# Patient Record
Sex: FEMALE | Race: WHITE | NOT HISPANIC OR LATINO | Employment: OTHER | URBAN - NONMETROPOLITAN AREA
[De-identification: names, ages, dates, MRNs, and addresses within clinical notes are randomized per-mention and may not be internally consistent; named-entity substitution may affect disease eponyms.]

---

## 2017-01-04 ENCOUNTER — OFFICE VISIT (OUTPATIENT)
Dept: ORTHOPEDIC SURGERY | Facility: CLINIC | Age: 67
End: 2017-01-04

## 2017-01-04 VITALS — WEIGHT: 199 LBS | RESPIRATION RATE: 18 BRPM | HEIGHT: 63 IN | BODY MASS INDEX: 35.26 KG/M2

## 2017-01-04 DIAGNOSIS — Z98.890 STATUS POST ARTHROSCOPIC SURGERY OF LEFT KNEE: Primary | ICD-10-CM

## 2017-01-04 DIAGNOSIS — M17.0 PRIMARY OSTEOARTHRITIS OF BOTH KNEES: ICD-10-CM

## 2017-01-04 DIAGNOSIS — M17.12 PRIMARY OSTEOARTHRITIS OF LEFT KNEE: ICD-10-CM

## 2017-01-04 PROCEDURE — 99024 POSTOP FOLLOW-UP VISIT: CPT | Performed by: ORTHOPAEDIC SURGERY

## 2017-01-04 PROCEDURE — 20610 DRAIN/INJ JOINT/BURSA W/O US: CPT | Performed by: ORTHOPAEDIC SURGERY

## 2017-01-04 PROCEDURE — 73560 X-RAY EXAM OF KNEE 1 OR 2: CPT | Performed by: ORTHOPAEDIC SURGERY

## 2017-01-04 RX ORDER — OXYCODONE AND ACETAMINOPHEN 10; 325 MG/1; MG/1
TABLET ORAL
Refills: 0 | COMMUNITY
Start: 2016-12-13 | End: 2017-03-07

## 2017-01-04 RX ADMIN — LIDOCAINE HYDROCHLORIDE 2 ML: 10 INJECTION, SOLUTION INFILTRATION; PERINEURAL at 15:16

## 2017-01-04 RX ADMIN — METHYLPREDNISOLONE ACETATE 40 MG: 40 INJECTION, SUSPENSION INTRA-ARTICULAR; INTRALESIONAL; INTRAMUSCULAR; SOFT TISSUE at 15:16

## 2017-01-04 NOTE — MR AVS SNAPSHOT
Lilian Eldridget   1/4/2017 2:00 PM   Office Visit    Dept Phone:  [phone]   Encounter #:  [id number]    Provider:  Brian Torres MD   Department:  [facility] ORTHOPEDICS AND SPORTS MEDICINE                Your Full Care Plan              Your Updated Medication List          This list is accurate as of: 1/4/17  3:37 PM.  Always use your most recent med list.                amitriptyline 10 MG tablet   Commonly known as:  ELAVIL       carisoprodol 350 MG tablet   Commonly known as:  SOMA       carvedilol 12.5 MG tablet   Commonly known as:  COREG       * citalopram 10 MG tablet   Commonly known as:  CeleXA       * citalopram 40 MG tablet   Commonly known as:  CeleXA       estropipate 1.5 MG tablet   Commonly known as:  OGEN       gabapentin 300 MG capsule   Commonly known as:  NEURONTIN       LORazepam 1 MG tablet   Commonly known as:  ATIVAN       losartan-hydrochlorothiazide 50-12.5 MG per tablet   Commonly known as:  HYZAAR       * metFORMIN  MG 24 hr tablet   Commonly known as:  GLUCOPHAGE-XR       * metFORMIN 500 MG tablet   Commonly known as:  GLUCOPHAGE       naproxen sodium 220 MG tablet   Commonly known as:  ALEVE       omeprazole 20 MG capsule   Commonly known as:  priLOSEC       * oxyCODONE 10 MG 12 hr tablet   Commonly known as:  oxyCONTIN       * oxyCODONE 10 MG tablet   Commonly known as:  ROXICODONE       oxyCODONE-acetaminophen  MG per tablet   Commonly known as:  PERCOCET       ranitidine 50-0.45 MG/50ML-%   Commonly known as:  ZANTAC IN NACL   PRE OPERATIVELY       traMADol 50 MG tablet   Commonly known as:  ULTRAM       * Notice:  This list has 6 medication(s) that are the same as other medications prescribed for you. Read the directions carefully, and ask your doctor or other care provider to review them with you.            We Performed the Following     Large Joint Arthrocentesis       You Were Diagnosed With        Codes  Comments    Status post arthroscopic surgery of left knee    -  Primary ICD-10-CM: Z98.890  ICD-9-CM: V45.89     Primary osteoarthritis of both knees     ICD-10-CM: M17.0  ICD-9-CM: 715.16     Primary osteoarthritis of left knee     ICD-10-CM: M17.12  ICD-9-CM: 715.16       Instructions     None    Patient Instructions History      Upcoming Appointments     Visit Type Date Time Department    POST-OP 2017  2:00 PM [facility]    OFFICE VISIT 4/3/2017  1:00 PM [facility]    FOLLOW UP 8/10/2017  1:30 PM [facility] Signup     [facility] [employer] allows you to send messages to your doctor, view your test results, renew your prescriptions, schedule appointments, and more. To sign up, go to [employer] and click on the Sign Up Now link in the New User? box. Enter your [employer] Activation Code exactly as it appears below along with the last four digits of your Social Security Number and your Date of Birth () to complete the sign-up process. If you do not sign up before the expiration date, you must request a new code.    [employer] Activation Code: [email or web address]  Expires: 2017  3:35 PM    If you have questions, you can email [email or web address] or call [phone] to talk to our [employer] staff. Remember, [employer] is NOT to be used for urgent needs. For medical emergencies, dial 911.               Other Info from Your Visit           Your Appointments     2017  1:00 PM EDT   Office Visit with Brian Torres MD   [facility] ORTHOPEDICS AND SPORTS MEDICINE (--)    [address]  [facility] [id number] [phone]           Arrive 15 minutes prior to appointment.            Aug 10, 2017  1:30 PM EDT   Follow Up with Rory Pineda MD   [facility] CARDIOLOGY (--)    [address]  [facility] [id number] [phone]           Arrive 15 minutes prior to appointment.             "  Allergies     Celebrex [Celecoxib]      Avoid routine use due to renal issues    Diflucan [Fluconazole]      Erythromycin      Neosporin [Neomycin-bacitracin Zn-polymyx]      Nsaids      Avoid routine use due to renal issues    Tape        Reason for Visit     Left Knee - Post-op           Vital Signs     Respirations Height Weight Body Mass Index Smoking Status       18 63\" (160 cm) 199 lb (90.3 kg) 35.25 kg/m2 Current Every Day Smoker       Problems and Diagnoses Noted     Osteoarthritis (arthritis due to wear and tear of joints)    Status post arthroscopic surgery of left knee    -  Primary    Degenerative joint disease of lower leg            "

## 2017-01-04 NOTE — PROGRESS NOTES
Subjective   Patient ID: Lilian Beauchamp is a 66 y.o. female is here today for a post-operative visit  Post-op of the Left Knee        History of Present Illness     Pain controlled: [] no   [x] yes   Medication refill requested: [x] no   [] yes    Patient compliant with instructions: [] no   [x] yes   Other: Patient reports persistent diffuse left knee pain with any notable relief after arthroscopy and subchondroplasty.  She is now 10 weeks from surgery.  There can be an extended recovery phase with the bone injection subchondroplasty though we also reviewed options at this time including and we proceeded with localized injection.     Past Medical History   Diagnosis Date   • Arthritis      rheumatoid and osteoarthritis   • Colon polyps    • Depression with anxiety 8/5/2016   • Derangement of meniscus      left   • Diabetes mellitus    • Diabetes mellitus, type 2 8/5/2016   • Disease of thyroid gland    • Diverticulosis small intestine    • Dyslipidemia 8/5/2016   • GERD (gastroesophageal reflux disease)    • H/O corticosteroid therapy      both shoulders   • Hyperlipidemia    • Hypertension    • Knee osteoarthritis    • Obese 8/5/2016     BMI 38.7   • Osteoarthritis 8/5/2016   • Renal abscess 2004     and sepsis wiht prolonged hospitalization    • Rheumatoid arthritis    • Tobacco abuse 8/5/2016     Waucoma packs of tobacco weekly   • Urine incontinence         Past Surgical History   Procedure Laterality Date   • Cholecystectomy     • Appendectomy     • Hysterectomy     • Parathyroidectomy     • Tonsillectomy and adenoidectomy     • Cataract extraction Bilateral    • Knee arthroscopy Left 10/21/2016     PMM, 2 CC, SUBCHONDROPLASTY - BRAD Torres MD       Allergies   Allergen Reactions   • Celebrex [Celecoxib]      Avoid routine use due to renal issues   • Diflucan [Fluconazole]    • Erythromycin    • Neosporin [Neomycin-Bacitracin Zn-Polymyx]    • Nsaids      Avoid routine use due to renal issues   • Tape   "      Review of Systems   Constitutional: Negative for diaphoresis and fever.   HENT: Negative for sore throat.    Eyes: Negative for visual disturbance.   Respiratory: Negative for shortness of breath.    Cardiovascular: Negative for chest pain.   Gastrointestinal: Negative for abdominal distention and abdominal pain.   Genitourinary: Negative for dysuria.   Musculoskeletal: Positive for arthralgias. Negative for gait problem and joint swelling.   Skin: Negative for rash.   Neurological: Negative for speech difficulty.   Hematological: Does not bruise/bleed easily.   Psychiatric/Behavioral: Negative for confusion.       Objective   Visit Vitals   • Resp 18   • Ht 63\" (160 cm)   • Wt 199 lb (90.3 kg)   • BMI 35.25 kg/m2         Signs of infection: [x] no                    [] yes   Drainage: [x] no                    [] yes   Incision: [] healing well     [x]healed well   Motor exam intact: [] no                    [x] yes   Neurovascular exam intact: [] no                    [x] yes   Signs of compartment syndrome: [x] no                    [] yes   Signs of DVT: [x] no                    [] yes   Other:      Physical Exam   Constitutional: She appears well-developed. No distress.   Musculoskeletal: She exhibits deformity (mild left knee varus).        Left knee: She exhibits no effusion.   Neurological: She is alert. Gait normal.   Skin: Skin is warm and dry. No rash noted. No erythema.   Psychiatric: She has a normal mood and affect. Her speech is normal.   Vitals reviewed.    Left Knee Exam     Tenderness   The patient is experiencing tenderness in the medial retinaculum, patella and pes anserinus.    Range of Motion   Extension: 0   Flexion: 100     Muscle Strength     The patient has normal left knee strength.    Tests   Donna:  Medial - negative   Varus: negative  Valgus: negative  Patellar Apprehension: negative    Other   Erythema: absent (baseline dystrophic darkening both lower legs above ankle " area.)  Scars: present (well helaed portatls)  Swelling: mild  Effusion: no effusion present        Extremity DVT signs are Negative on physical exam with negative Memo sign, with no calf pain and with no palpable cords  Neurologic Exam     Mental Status   Attention: normal.   Speech: speech is normal   Level of consciousness: alert  Knowledge: good.     Motor Exam   Overall muscle tone: normal    Gait, Coordination, and Reflexes     Gait  Gait: normal (stable part-time cane assist ambulation.  Does not have cane at office visit today.)    Left Knee Exam     Muscle Strength   Normal left knee strength          Assessment/Plan   Independent Review of Radiographic Studies:    Indication to evaluate pain and with prior comparison views, shows no acute fracture or disclocation evident.  Indication to evaluate joint condition, and compared with prior images, shows evident chronic advanced anteromedial osteoarthritis.  The bone filler calcium triphosphate can be well seen on the radiographs in the desired location of the subchondral medial tibia plateau.  Laboratory and Other Studies:  No new results reviewed today.   Medical Decision Making:    No complications of procedure and treatments.  Limited progress with persistent and/or progressive symptoms.  Continue current management and any additional treatments and workup as outlined in plan.     Large Joint Arthrocentesis  Date/Time: 1/4/2017 3:16 PM  Consent given by: patient  Site marked: site marked  Timeout: Immediately prior to procedure a time out was called to verify the correct patient, procedure, equipment, support staff and site/side marked as required   Supporting Documentation  Indications: pain   Procedure Details  Location: knee - L knee  Preparation: Patient was prepped and draped in the usual sterile fashion  Needle size: 22 G  Approach: anteromedial  Medications administered: 2 mL lidocaine 1 %; 40 mg methylPREDNISolone acetate 40 MG/ML  Patient  tolerance: patient tolerated the procedure well with no immediate complications          Lilian was seen today for post-op.    Diagnoses and all orders for this visit:    Status post arthroscopic surgery of left knee  -     XR Knee 1 or 2 View Left    Primary osteoarthritis of both knees  -     XR Knee 1 or 2 View Left    Primary osteoarthritis of left knee  -     Large Joint Arthrocentesis         Recommendations/Plan:     Staples/Sutures removed: []At today's visit     [x]At previous visit   Brace: [x]not provided        []pt provided with:   Physical therapy: []not at this time    [x]home-based    []outpatient referral   Ultrasound: [x]not ordered         []order given to patient   Labs: [x]not ordered         []order given to patient   Weight Bearing status: [x]Full []WBAT []PWB []NWB []Other   Prescriptions: [x]None given   []As Noted   Imaging at next appt: []Yes  [x]No          Additional instructions: Patient agreeable to return sooner for any new concern.     Regular exercise as tolerated  Orthopedic activities reviewed and patient expressed appreciation  Discussion of orthopedic goals  Risk, benefits, and merits of treatment alternatives reviewed with the patient and questions answered  Call or notify for any adverse effect from injection therapy  Ice, heat, and/or modalities as beneficial  Watch for signs and symptoms of infection    Exercise, medications, injections, other patient advice, and return appointment as noted.  Brace: No brace was given at today's visit  Referral: No referrals made at today's visit  Test/Studies: No additional studies ordered.  Surgery: No surgery proposed at this visit.  Work/Activity Status: May perform usual activities as tolerated and No strenuous activity  Patient is encouraged to call or return for any issues or concerns.    Return in about 3 months (around 4/4/2017) for Recheck.  Patient agreeable to call or return sooner for any concerns.

## 2017-01-08 RX ORDER — LIDOCAINE HYDROCHLORIDE 10 MG/ML
2 INJECTION, SOLUTION INFILTRATION; PERINEURAL
Status: COMPLETED | OUTPATIENT
Start: 2017-01-04 | End: 2017-01-04

## 2017-01-08 RX ORDER — METHYLPREDNISOLONE ACETATE 40 MG/ML
40 INJECTION, SUSPENSION INTRA-ARTICULAR; INTRALESIONAL; INTRAMUSCULAR; SOFT TISSUE
Status: COMPLETED | OUTPATIENT
Start: 2017-01-04 | End: 2017-01-04

## 2017-03-07 ENCOUNTER — CONSULT (OUTPATIENT)
Dept: CARDIOLOGY | Facility: CLINIC | Age: 67
End: 2017-03-07

## 2017-03-07 VITALS
HEIGHT: 63 IN | WEIGHT: 202.2 LBS | RESPIRATION RATE: 18 BRPM | HEART RATE: 72 BPM | BODY MASS INDEX: 35.83 KG/M2 | SYSTOLIC BLOOD PRESSURE: 122 MMHG | DIASTOLIC BLOOD PRESSURE: 70 MMHG | OXYGEN SATURATION: 98 %

## 2017-03-07 DIAGNOSIS — R42 DIZZINESS: ICD-10-CM

## 2017-03-07 DIAGNOSIS — I10 ESSENTIAL HYPERTENSION: ICD-10-CM

## 2017-03-07 DIAGNOSIS — R07.9 CHEST PAIN IN ADULT: Primary | ICD-10-CM

## 2017-03-07 DIAGNOSIS — R06.02 SHORTNESS OF BREATH: ICD-10-CM

## 2017-03-07 DIAGNOSIS — R00.2 PALPITATIONS: ICD-10-CM

## 2017-03-07 PROCEDURE — 93000 ELECTROCARDIOGRAM COMPLETE: CPT | Performed by: INTERNAL MEDICINE

## 2017-03-07 PROCEDURE — 99215 OFFICE O/P EST HI 40 MIN: CPT | Performed by: INTERNAL MEDICINE

## 2017-03-07 RX ORDER — OXYCODONE AND ACETAMINOPHEN 10; 325 MG/1; MG/1
1 TABLET ORAL EVERY 6 HOURS PRN
COMMUNITY

## 2017-03-07 NOTE — PROGRESS NOTES
Subjective:     Encounter Date:03/07/2017      Patient ID: Lilian Beauchamp is a 66 y.o. female.    Chief Complaint: Chest pain ,shortness of breath, palpitations  HPI  This is a 66-year-old female patient with no prior history of documented cardiac disease who presents to cardiology clinic with a several week history of worsening chest discomfort.  She describes the discomfort as a retrosternal pressure sensation that radiates into her left arm and left posterior back.  The discomfort occurs approximately 3-4 times per week and will last anywhere from a few minutes to half an hour.  The discomfort is not associated with shortness of breath nausea vomiting or diaphoresis.  She cannot identify any precipitating aggravating or alleviating features.  The discomfort nearly occurs at rest.  She previously underwent a cardiac catheterization in 2008 which showed no focal obstructive coronary disease.  Her ejection fraction was normal at that time.  She also reports having shortness of breath both at rest and with activity.  There is no orthopnea or PND.  She does have lower extremity edema which occurs on occasion and is worse if she's been on her feet for prolonged periods of time.  She has a history of hypertension which is treated but takes no cholesterol-lowering medication.  Her past medical history is remarkable for hyperlipidemia but the details are unknown.  The patient has also had palpitations and tachycardia for many years.  She indicates that she was treated with propranolol for many years for sinus tachycardia.  There is no history of documented arrhythmia.  She has no dizziness or syncope.  It has been over 5 years since her last stress test.  It has been over 5 years since her last heart monitor.  She has a history of type 2 diabetes mellitus.  She is a nonsmoker.  Her family history is remarkable for premature coronary disease.  The following portions of the patient's history were reviewed and updated  as appropriate: allergies, current medications, past family history, past medical history, past social history, past surgical history and problem  Review of Systems   Constitution: Negative for chills, diaphoresis, fever, weakness, malaise/fatigue, night sweats, weight gain and weight loss.   HENT: Negative for ear discharge, hearing loss, hoarse voice and nosebleeds.    Eyes: Negative for discharge, double vision, pain and photophobia.   Cardiovascular: Positive for chest pain, dyspnea on exertion, irregular heartbeat, leg swelling and palpitations. Negative for claudication, cyanosis, near-syncope, orthopnea, paroxysmal nocturnal dyspnea and syncope.   Respiratory: Positive for shortness of breath. Negative for cough, hemoptysis, sputum production and wheezing.    Endocrine: Negative for cold intolerance, heat intolerance, polydipsia, polyphagia and polyuria.   Hematologic/Lymphatic: Negative for adenopathy and bleeding problem. Does not bruise/bleed easily.   Skin: Negative for color change, flushing, itching and rash.   Musculoskeletal: Negative for muscle cramps, muscle weakness, myalgias and stiffness.   Gastrointestinal: Negative for abdominal pain, diarrhea, hematemesis, hematochezia, nausea and vomiting.   Genitourinary: Negative for dysuria, frequency and nocturia.   Neurological: Negative for focal weakness, loss of balance, numbness, paresthesias and seizures.   Psychiatric/Behavioral: Negative for altered mental status, hallucinations and suicidal ideas.   Allergic/Immunologic: Negative for HIV exposure, hives and persistent infections.       Current Outpatient Prescriptions:   •  amitriptyline (ELAVIL) 10 MG tablet, Take 10 mg by mouth Every Night., Disp: , Rfl: 1  •  carisoprodol (SOMA) 350 MG tablet, Take 350 mg by mouth every night., Disp: , Rfl:   •  carvedilol (COREG) 12.5 MG tablet, Take 12.5 mg by mouth 2 (two) times a day with meals., Disp: , Rfl:   •  estropipate (OGEN) 1.5 MG tablet, , Disp:  , Rfl: 1  •  gabapentin (NEURONTIN) 300 MG capsule, take 1 capsule by mouth three times a day, Disp: , Rfl: 0  •  LORazepam (ATIVAN) 1 MG tablet, Take 1 mg by mouth every 12 (twelve) hours as needed for anxiety., Disp: , Rfl:   •  losartan-hydrochlorothiazide (HYZAAR) 50-12.5 MG per tablet, take 1 tablet by mouth once daily, Disp: , Rfl: 0  •  naproxen sodium (ALEVE) 220 MG tablet, Take 220 mg by mouth 2 (two) times a day as needed for mild pain (1-3)., Disp: , Rfl:   •  omeprazole (PriLOSEC) 20 MG capsule, Take 20 mg by mouth daily., Disp: , Rfl:   •  oxyCODONE-acetaminophen (PERCOCET)  MG per tablet, Take 1 tablet by mouth Every 6 (Six) Hours As Needed for moderate pain (4-6)., Disp: , Rfl:   •  traMADol (ULTRAM) 50 MG tablet, TAKE 1 TABLET BY MOUTH EVERY 8 HOURS, Disp: , Rfl: 0  •  citalopram (CeleXA) 40 MG tablet, Take 40 mg by mouth 2 (Two) Times a Day., Disp: , Rfl: 0  •  metFORMIN XR (GLUCOPHAGE-XR) 500 MG 24 hr tablet, Take 1,000 mg by mouth 2 (two) times a day., Disp: , Rfl:      Objective:     Physical Exam   Constitutional: She is oriented to person, place, and time. She appears well-developed and well-nourished.   HENT:   Head: Normocephalic and atraumatic.   Mouth/Throat: Oropharynx is clear and moist.   Eyes: Conjunctivae and EOM are normal. Pupils are equal, round, and reactive to light. No scleral icterus.   Neck: Normal range of motion. Neck supple. No JVD present. No tracheal deviation present. No thyromegaly present.   Cardiovascular: Normal rate, regular rhythm, S1 normal, S2 normal, normal heart sounds, intact distal pulses and normal pulses.  PMI is not displaced.  Exam reveals no gallop and no friction rub.    No murmur heard.  Pulmonary/Chest: Effort normal and breath sounds normal. No respiratory distress. She has no wheezes. She has no rales.   Abdominal: Soft. Bowel sounds are normal. She exhibits no distension and no mass. There is no tenderness. There is no rebound and no guarding.  "  Musculoskeletal: Normal range of motion. She exhibits no edema or deformity.   Neurological: She is alert and oriented to person, place, and time. She displays normal reflexes. No cranial nerve deficit. Coordination normal.   Skin: Skin is warm and dry. No rash noted. No erythema.   Psychiatric: She has a normal mood and affect. Her behavior is normal. Thought content normal.     Blood pressure 122/70, pulse 72, resp. rate 18, height 63\" (160 cm), weight 202 lb 3.2 oz (91.7 kg), SpO2 98 %.   Lab Review:       Assessment:         1. Chest pain in adult  The patient's chest discomfort has features both typical and atypical for coronary insufficiency.  She has multiple risk factors for premature coronary disease.  She is unable to do adequate treadmill exercise stress testing due to poor effort tolerance, shortness of breath with activity and obesity.  - ECG 12 Lead  - Stress Test With Myocardial Perfusion Two Day  - Adult Transthoracic Echo Complete    2. Shortness of breath  This is multifactorial in etiology due to a combination of obesity and aerobic deconditioning.  There may be an element of hypertensive related cardiac disease and/or unrecognized congestive heart failure.  This could also represent an angina equivalent.  - ECG 12 Lead  - Stress Test With Myocardial Perfusion Two Day  - Adult Transthoracic Echo Complete    3. Palpitations  Of her symptoms could be related to underlying arrhythmia and/or ectopy.  - ECG 12 Lead  - Adult Transthoracic Echo Complete  - Holter Monitor - 72 Hour    4. Dizziness  This is mostly postural in nature and I suspect is related to her medications.  It could be related to underlying undiagnosed arrhythmia.  - Adult Transthoracic Echo Complete  - Holter Monitor - 72 Hour    5. Essential hypertension  Blood pressure control is quite acceptable by today's reading.  - Stress Test With Myocardial Perfusion Two Day  - Adult Transthoracic Echo Complete    ECG 12 Lead  Date/Time: " 3/7/2017 2:07 PM  Performed by: JEREMY GAITAN  Authorized by: JEREMY GAITAN                Plan:       I recommended a 2 day vasodilator nuclear stress test due to her body habitus.  I have recommended an echocardiogram as well as a 72 hour Holter monitor.  No changes in her medication profile have been made at today's visit.  Further recommendations will be predicated on the outcome of her outpatient testing.

## 2017-03-27 ENCOUNTER — APPOINTMENT (OUTPATIENT)
Dept: NUCLEAR MEDICINE | Facility: HOSPITAL | Age: 67
End: 2017-03-27
Attending: INTERNAL MEDICINE

## 2017-03-28 ENCOUNTER — APPOINTMENT (OUTPATIENT)
Dept: GENERAL RADIOLOGY | Facility: HOSPITAL | Age: 67
End: 2017-03-28
Attending: INTERNAL MEDICINE

## 2017-03-28 ENCOUNTER — APPOINTMENT (OUTPATIENT)
Dept: NUCLEAR MEDICINE | Facility: HOSPITAL | Age: 67
End: 2017-03-28
Attending: INTERNAL MEDICINE

## 2017-09-30 ENCOUNTER — TRANSCRIBE ORDERS (OUTPATIENT)
Dept: ADMINISTRATIVE | Facility: HOSPITAL | Age: 67
End: 2017-09-30

## 2017-09-30 DIAGNOSIS — E11.9 DIABETES MELLITUS WITHOUT COMPLICATION (HCC): ICD-10-CM

## 2017-09-30 DIAGNOSIS — R53.83 OTHER FATIGUE: Primary | ICD-10-CM

## 2019-02-26 ENCOUNTER — TELEPHONE (OUTPATIENT)
Dept: SURGERY | Facility: CLINIC | Age: 69
End: 2019-02-26

## 2021-06-16 ENCOUNTER — HOSPITAL ENCOUNTER (OUTPATIENT)
Dept: ULTRASOUND IMAGING | Facility: HOSPITAL | Age: 71
Discharge: HOME | End: 2021-06-16
Admitting: NURSE PRACTITIONER

## 2021-06-16 ENCOUNTER — TRANSCRIBE ORDERS (OUTPATIENT)
Dept: ULTRASOUND IMAGING | Facility: HOSPITAL | Age: 71
End: 2021-06-16

## 2021-06-16 DIAGNOSIS — M79.602 LEFT ARM PAIN: Primary | ICD-10-CM

## 2021-06-16 DIAGNOSIS — M79.602 LEFT ARM PAIN: ICD-10-CM

## 2021-06-16 PROCEDURE — 93971 EXTREMITY STUDY: CPT

## 2021-07-08 PROBLEM — K90.9 CHRONIC STEATORRHEA: Status: ACTIVE | Noted: 2021-07-08

## 2021-07-08 PROBLEM — D50.9 IRON DEFICIENCY ANEMIA, UNSPECIFIED: Status: ACTIVE | Noted: 2021-07-08

## 2021-07-08 RX ORDER — SODIUM CHLORIDE 9 MG/ML
250 INJECTION, SOLUTION INTRAVENOUS CONTINUOUS
Status: CANCELLED | OUTPATIENT
Start: 2021-07-12

## 2021-07-12 ENCOUNTER — HOSPITAL ENCOUNTER (OUTPATIENT)
Dept: INFUSION THERAPY | Facility: HOSPITAL | Age: 71
Setting detail: INFUSION SERIES
End: 2021-07-12

## 2021-07-19 ENCOUNTER — INFUSION (OUTPATIENT)
Dept: ONCOLOGY | Facility: HOSPITAL | Age: 71
End: 2021-07-19

## 2021-07-19 VITALS — BODY MASS INDEX: 34.45 KG/M2 | WEIGHT: 194.5 LBS

## 2021-07-19 DIAGNOSIS — K90.9 CHRONIC STEATORRHEA: ICD-10-CM

## 2021-07-19 DIAGNOSIS — D50.9 IRON DEFICIENCY ANEMIA, UNSPECIFIED IRON DEFICIENCY ANEMIA TYPE: Primary | ICD-10-CM

## 2021-07-19 PROCEDURE — 25010000002 FERRIC CARBOXYMALTOSE 750 MG/15ML SOLUTION 15 ML VIAL: Performed by: NURSE PRACTITIONER

## 2021-07-19 PROCEDURE — 96375 TX/PRO/DX INJ NEW DRUG ADDON: CPT

## 2021-07-19 PROCEDURE — 96365 THER/PROPH/DIAG IV INF INIT: CPT

## 2021-07-19 RX ORDER — SODIUM CHLORIDE 9 MG/ML
250 INJECTION, SOLUTION INTRAVENOUS CONTINUOUS
Status: DISCONTINUED | OUTPATIENT
Start: 2021-07-19 | End: 2021-07-19 | Stop reason: HOSPADM

## 2021-07-19 RX ORDER — SODIUM CHLORIDE 9 MG/ML
250 INJECTION, SOLUTION INTRAVENOUS CONTINUOUS
Status: CANCELLED | OUTPATIENT
Start: 2021-07-26

## 2021-07-19 RX ADMIN — FERRIC CARBOXYMALTOSE INJECTION 750 MG: 50 INJECTION, SOLUTION INTRAVENOUS at 14:58

## 2021-07-26 ENCOUNTER — APPOINTMENT (OUTPATIENT)
Dept: INFUSION THERAPY | Facility: HOSPITAL | Age: 71
End: 2021-07-26

## 2021-07-29 ENCOUNTER — APPOINTMENT (OUTPATIENT)
Dept: INFUSION THERAPY | Facility: HOSPITAL | Age: 71
End: 2021-07-29

## 2021-08-03 ENCOUNTER — APPOINTMENT (OUTPATIENT)
Dept: INFUSION THERAPY | Facility: HOSPITAL | Age: 71
End: 2021-08-03

## 2021-08-03 ENCOUNTER — INFUSION (OUTPATIENT)
Dept: ONCOLOGY | Facility: HOSPITAL | Age: 71
End: 2021-08-03

## 2021-08-03 VITALS
HEART RATE: 61 BPM | RESPIRATION RATE: 14 BRPM | WEIGHT: 193.7 LBS | OXYGEN SATURATION: 98 % | SYSTOLIC BLOOD PRESSURE: 152 MMHG | BODY MASS INDEX: 34.31 KG/M2 | TEMPERATURE: 97.5 F | DIASTOLIC BLOOD PRESSURE: 71 MMHG

## 2021-08-03 DIAGNOSIS — D50.9 IRON DEFICIENCY ANEMIA, UNSPECIFIED IRON DEFICIENCY ANEMIA TYPE: Primary | ICD-10-CM

## 2021-08-03 DIAGNOSIS — K90.9 CHRONIC STEATORRHEA: ICD-10-CM

## 2021-08-03 PROCEDURE — 96365 THER/PROPH/DIAG IV INF INIT: CPT

## 2021-08-03 PROCEDURE — 25010000002 FERRIC CARBOXYMALTOSE 750 MG/15ML SOLUTION 15 ML VIAL: Performed by: NURSE PRACTITIONER

## 2021-08-03 RX ORDER — SODIUM CHLORIDE 9 MG/ML
250 INJECTION, SOLUTION INTRAVENOUS CONTINUOUS
Status: CANCELLED | OUTPATIENT
Start: 2021-08-03

## 2021-08-03 RX ORDER — SODIUM CHLORIDE 9 MG/ML
250 INJECTION, SOLUTION INTRAVENOUS CONTINUOUS
Status: DISCONTINUED | OUTPATIENT
Start: 2021-08-03 | End: 2021-08-03 | Stop reason: HOSPADM

## 2021-08-03 RX ADMIN — FERRIC CARBOXYMALTOSE INJECTION 750 MG: 50 INJECTION, SOLUTION INTRAVENOUS at 14:15

## 2021-08-26 ENCOUNTER — TRANSCRIBE ORDERS (OUTPATIENT)
Dept: LAB | Facility: HOSPITAL | Age: 71
End: 2021-08-26

## 2021-08-26 DIAGNOSIS — D64.9 ANEMIA, UNSPECIFIED TYPE: Primary | ICD-10-CM

## 2021-10-29 ENCOUNTER — TRANSCRIBE ORDERS (OUTPATIENT)
Dept: GENERAL RADIOLOGY | Facility: HOSPITAL | Age: 71
End: 2021-10-29

## 2021-10-29 ENCOUNTER — HOSPITAL ENCOUNTER (OUTPATIENT)
Dept: GENERAL RADIOLOGY | Facility: HOSPITAL | Age: 71
Discharge: HOME | End: 2021-10-29
Admitting: INTERNAL MEDICINE

## 2021-10-29 DIAGNOSIS — R06.09 OTHER FORMS OF DYSPNEA: Primary | ICD-10-CM

## 2021-10-29 DIAGNOSIS — R06.09 OTHER FORMS OF DYSPNEA: ICD-10-CM

## 2021-10-29 PROCEDURE — 71046 X-RAY EXAM CHEST 2 VIEWS: CPT

## 2022-11-08 ENCOUNTER — TELEPHONE (OUTPATIENT)
Dept: UROLOGY | Facility: CLINIC | Age: 72
End: 2022-11-08

## 2022-11-08 NOTE — TELEPHONE ENCOUNTER
Provider: CORINNE BOURGEOIS  Caller: MANJU BOWIE  Relationship to Patient: DAUGHTER     Phone Number: [phone]  Reason for Call: APPT TODAY WAS RESCHEDULED TO 11-16-22, PT DIDN'T HAVE TRANSPORTATION FOR APPT TODAY.

## 2022-11-10 NOTE — TELEPHONE ENCOUNTER
I called patient and only has transportation or the 16th, but thanked us for offering to get her in soon.

## 2022-11-16 ENCOUNTER — TELEPHONE (OUTPATIENT)
Dept: UROLOGY | Facility: CLINIC | Age: 72
End: 2022-11-16

## 2022-11-16 NOTE — TELEPHONE ENCOUNTER
Caller: BEKA    Relationship to patient: SELF    Best call back number: [phone]    Patient is needing:   PT IS CALLING TO CANCEL SAME DAY APPT: PT IS SICK.

## 2024-05-20 ENCOUNTER — TRANSCRIBE ORDERS (OUTPATIENT)
Dept: ADMINISTRATIVE | Facility: HOSPITAL | Age: 74
End: 2024-05-20
Payer: MEDICARE

## 2024-05-20 DIAGNOSIS — M48.061 SPINAL STENOSIS, LUMBAR REGION, WITHOUT NEUROGENIC CLAUDICATION: Primary | ICD-10-CM

## 2024-12-10 ENCOUNTER — APPOINTMENT (OUTPATIENT)
Dept: GENERAL RADIOLOGY | Facility: HOSPITAL | Age: 74
End: 2024-12-10
Payer: MEDICARE

## 2024-12-10 ENCOUNTER — APPOINTMENT (OUTPATIENT)
Dept: CT IMAGING | Facility: HOSPITAL | Age: 74
End: 2024-12-10
Payer: MEDICARE

## 2024-12-10 ENCOUNTER — HOSPITAL ENCOUNTER (EMERGENCY)
Facility: HOSPITAL | Age: 74
Discharge: HOME | End: 2024-12-10
Attending: STUDENT IN AN ORGANIZED HEALTH CARE EDUCATION/TRAINING PROGRAM | Admitting: STUDENT IN AN ORGANIZED HEALTH CARE EDUCATION/TRAINING PROGRAM
Payer: MEDICARE

## 2024-12-10 VITALS
OXYGEN SATURATION: 93 % | SYSTOLIC BLOOD PRESSURE: 170 MMHG | BODY MASS INDEX: 32.78 KG/M2 | HEIGHT: 63 IN | TEMPERATURE: 98.1 F | RESPIRATION RATE: 18 BRPM | DIASTOLIC BLOOD PRESSURE: 99 MMHG | HEART RATE: 69 BPM | WEIGHT: 185 LBS

## 2024-12-10 DIAGNOSIS — M54.41 ACUTE MIDLINE LOW BACK PAIN WITH RIGHT-SIDED SCIATICA: Primary | ICD-10-CM

## 2024-12-10 LAB
BILIRUB UR QL STRIP: NEGATIVE
CLARITY UR: CLEAR
COLOR UR: YELLOW
GLUCOSE UR STRIP-MCNC: NEGATIVE MG/DL
HGB UR QL STRIP.AUTO: NEGATIVE
KETONES UR QL STRIP: ABNORMAL
LEUKOCYTE ESTERASE UR QL STRIP.AUTO: NEGATIVE
NITRITE UR QL STRIP: NEGATIVE
PH UR STRIP.AUTO: 5.5 [PH] (ref 5–8)
PROT UR QL STRIP: NEGATIVE
SP GR UR STRIP: 1.02 (ref 1–1.03)
UROBILINOGEN UR QL STRIP: ABNORMAL

## 2024-12-10 PROCEDURE — 73502 X-RAY EXAM HIP UNI 2-3 VIEWS: CPT

## 2024-12-10 PROCEDURE — 72131 CT LUMBAR SPINE W/O DYE: CPT

## 2024-12-10 PROCEDURE — 99284 EMERGENCY DEPT VISIT MOD MDM: CPT | Performed by: STUDENT IN AN ORGANIZED HEALTH CARE EDUCATION/TRAINING PROGRAM

## 2024-12-10 PROCEDURE — 81003 URINALYSIS AUTO W/O SCOPE: CPT | Performed by: STUDENT IN AN ORGANIZED HEALTH CARE EDUCATION/TRAINING PROGRAM

## 2024-12-10 PROCEDURE — 25010000002 HYDROMORPHONE 1 MG/ML SOLUTION: Performed by: STUDENT IN AN ORGANIZED HEALTH CARE EDUCATION/TRAINING PROGRAM

## 2024-12-10 PROCEDURE — 96372 THER/PROPH/DIAG INJ SC/IM: CPT

## 2024-12-10 RX ORDER — OXYCODONE AND ACETAMINOPHEN 5; 325 MG/1; MG/1
2 TABLET ORAL ONCE
Status: COMPLETED | OUTPATIENT
Start: 2024-12-10 | End: 2024-12-10

## 2024-12-10 RX ORDER — CYCLOBENZAPRINE HCL 10 MG
10 TABLET ORAL 3 TIMES DAILY PRN
Qty: 12 TABLET | Refills: 0 | Status: SHIPPED | OUTPATIENT
Start: 2024-12-10

## 2024-12-10 RX ORDER — CYCLOBENZAPRINE HCL 10 MG
10 TABLET ORAL ONCE
Status: COMPLETED | OUTPATIENT
Start: 2024-12-10 | End: 2024-12-10

## 2024-12-10 RX ADMIN — OXYCODONE HYDROCHLORIDE AND ACETAMINOPHEN 2 TABLET: 5; 325 TABLET ORAL at 16:57

## 2024-12-10 RX ADMIN — HYDROMORPHONE HYDROCHLORIDE 1 MG: 1 INJECTION, SOLUTION INTRAMUSCULAR; INTRAVENOUS; SUBCUTANEOUS at 15:11

## 2024-12-10 RX ADMIN — CYCLOBENZAPRINE 10 MG: 10 TABLET, FILM COATED ORAL at 15:11

## 2024-12-10 NOTE — ED PROVIDER NOTES
EMERGENCY DEPARTMENT ENCOUNTER    Pt Name: Lilian Beauchamp  MRN: [id number]  Pt :   1950  Room Number:  21SF/21  Date of encounter:  12/10/2024  PCP: García Yap MD  ED Provider: Brian Red PA-C    Historian: Patient, nursing notes      HPI:  Chief Complaint: Low back pain, right leg pain        Context: Lilian Beauchamp is a 74 y.o. female who presents to the ED c/o acute low back pain with right radicular pain symptoms abdomen going for several weeks.  Patient states she deals with this kind of pain chronically but over the last 2 weeks it has gotten worse to the point where is painful to walk.  Patient denies any leg swelling or numbness, tingling, dysuria, bowel or bladder incontinence, urinary retention, saddle anesthesia, fever, or any other complaint today.    PAST MEDICAL HISTORY  Past Medical History:   Diagnosis Date    Anemia     Angina at rest     Arthritis     rheumatoid and osteoarthritis    Colon polyps     Depression with anxiety 2016    Derangement of meniscus     left    Diabetes mellitus     Diabetes mellitus, type 2 2016    Disease of thyroid gland     Diverticulosis small intestine     Dyslipidemia 2016    GERD (gastroesophageal reflux disease)     H/O corticosteroid therapy     both shoulders    Hyperlipidemia     Hypertension     Kidney infection     Knee osteoarthritis     Obese 2016    BMI 38.7    Osteoarthritis 2016    Palpitations     Renal abscess 2004    and sepsis wiht prolonged hospitalization     Rheumatoid arthritis     Sinus tachycardia     Thyroid disease     Tobacco abuse 2016    [place] packs of tobacco weekly    Urine incontinence          PAST SURGICAL HISTORY  Past Surgical History:   Procedure Laterality Date    APPENDECTOMY      CATARACT EXTRACTION Bilateral     CHOLECYSTECTOMY      HYSTERECTOMY      KNEE ARTHROSCOPY Left 10/21/2016    PMM, 2 CC, SUBCHONDROPLASTY - BRAD Torres MD    PARATHYROIDECTOMY      TONSILLECTOMY AND  ADENOIDECTOMY           FAMILY HISTORY  Family History   Problem Relation Age of Onset    Hypertension Mother     Heart attack Mother     Heart disease Mother         CABG    Hyperlipidemia Mother     Breast cancer Sister     Diabetes Paternal Uncle          SOCIAL HISTORY  Social History     Socioeconomic History    Marital status:    Tobacco Use    Smoking status: Every Day     Current packs/day: 0.50     Average packs/day: 0.5 packs/day for 15.0 years (7.5 ttl pk-yrs)     Types: Cigarettes    Smokeless tobacco: Never   Substance and Sexual Activity    Alcohol use: No    Drug use: No    Sexual activity: Defer         ALLERGIES  Celebrex [celecoxib], Diflucan [fluconazole], Erythromycin, Neosporin [neomycin-bacitracin zn-polymyx], Nsaids, and Tape        REVIEW OF SYSTEMS  Review of Systems   Constitutional:  Negative for chills and fever.   HENT:  Negative for congestion and sore throat.    Respiratory:  Negative for cough and shortness of breath.    Cardiovascular:  Negative for chest pain.   Gastrointestinal:  Negative for abdominal pain, nausea and vomiting.   Genitourinary:  Negative for dysuria.   Musculoskeletal:  Positive for back pain.        Right leg pain   Skin:  Negative for wound.   Neurological:  Negative for dizziness and headaches.   Psychiatric/Behavioral:  Negative for confusion.    All other systems reviewed and are negative.         All systems reviewed and negative except for those discussed in HPI.       PHYSICAL EXAM    I have reviewed the triage vital signs and nursing notes.    ED Triage Vitals   Temp Pulse Resp BP SpO2   -- -- -- -- --      Temp src Heart Rate Source Patient Position BP Location FiO2 (%)   -- -- -- -- --       Physical Exam  Vitals and nursing note reviewed.   Constitutional:       General: She is not in acute distress.     Appearance: She is not ill-appearing, toxic-appearing or diaphoretic.   HENT:      Head: Normocephalic and atraumatic.      Mouth/Throat:       Mouth: Mucous membranes are moist.      Pharynx: Oropharynx is clear.   Eyes:      Extraocular Movements: Extraocular movements intact.   Cardiovascular:      Rate and Rhythm: Normal rate.      Heart sounds: Normal heart sounds.   Pulmonary:      Effort: Pulmonary effort is normal. No respiratory distress.      Breath sounds: Normal breath sounds. No stridor. No wheezing or rales.   Abdominal:      Tenderness: There is no abdominal tenderness.   Musculoskeletal:      Cervical back: Normal.      Thoracic back: Normal.      Lumbar back: Tenderness and bony tenderness present.   Skin:     General: Skin is warm and dry.      Findings: No rash.   Neurological:      Mental Status: She is alert.             LAB RESULTS  Recent Results (from the past 24 hours)   Urinalysis With Culture If Indicated - Urine, Clean Catch    Collection Time: 12/10/24  4:26 PM    Specimen: Urine, Clean Catch   Result Value Ref Range    Color, UA Yellow Yellow, Straw    Appearance, UA Clear Clear    pH, UA 5.5 5.0 - 8.0    Specific Gravity, UA 1.018 1.005 - 1.030    Glucose, UA Negative Negative    Ketones, UA Trace (A) Negative    Bilirubin, UA Negative Negative    Blood, UA Negative Negative    Protein, UA Negative Negative    Leuk Esterase, UA Negative Negative    Nitrite, UA Negative Negative    Urobilinogen, UA 1.0 E.U./dL 0.2 - 1.0 E.U./dL       If labs were ordered, I independently reviewed the results and considered them in treating the patient.        RADIOLOGY  CT Lumbar Spine Without Contrast    Result Date: 12/10/2024  FINAL REPORT TECHNIQUE: null CLINICAL HISTORY: Acute midline low back pain with right radicular symptoms COMPARISON: null FINDINGS: CT lumbar spine without contrast Comparison: None Findings: There is degenerative grade 1 anterolisthesis of L4 on L5. There is mild lumbar levocurvature. There is no fracture. There is moderate L3-4 and L4-5 degenerative disc disease. There is lower lumbar facet osteoarthritis including  severe arthritis at L4-5 with ankylosis. There appears to be moderate central canal stenosis at L4-5 and moderate to severe central canal stenosis at L5-S1. There is moderate to severe bilateral neuroforaminal stenosis at L3-4 and additional multilevel mild and moderate neuroforaminal stenoses.     Impression: 1. No acute findings. 2. Lumbar spondylosis. Authenticated and Electronically Signed by Fox Cooper MD on 12/10/2024 06:48:32 PM    XR Hip With or Without Pelvis 2 - 3 View Right    Result Date: 12/10/2024  PROCEDURE: XR HIP W OR WO PELVIS 2-3 VIEW RIGHT-  HISTORY: Acute right hip pain radiating to right upper leg  COMPARISON: None.  FINDINGS:  A two view exam demonstrates no acute fracture or dislocation. The hip joint spaces appear unremarkable. No soft tissue abnormality is seen. There is narrowing and sclerosis of the SI joints bilaterally. Vascular calcifications noted.      No acute bony abnormality.      This report was signed and finalized on 12/10/2024 3:26 PM by Lakisha Ace MD.       I ordered and independently reviewed the above noted radiographic studies.      I viewed images of CT lumbar spine which showed no acute disease per my independent interpretation.    I viewed images of the right hip Xray with PA pelvis which showed no acute fracture per my independent interpretation      See radiologist's dictation for official interpretation.        PROCEDURES    Procedures    No orders to display       MEDICATIONS GIVEN IN ER    Medications   HYDROmorphone (DILAUDID) injection 1 mg (1 mg Intramuscular Given 12/10/24 1511)   cyclobenzaprine (FLEXERIL) tablet 10 mg (10 mg Oral Given 12/10/24 1511)   oxyCODONE-acetaminophen (PERCOCET) 5-325 MG per tablet 2 tablet (2 tablets Oral Given 12/10/24 5657)         MEDICAL DECISION MAKING, PROGRESS, and CONSULTS    All labs, if obtained, have been independently reviewed by me.  All radiology studies, if obtained, have been reviewed by me and the radiologist  dictating the report.  All EKG's, if obtained, have been independently viewed and interpreted by me/my attending physician.      Discussion below represents my analysis of pertinent findings related to patient's condition, differential diagnosis, treatment plan and final disposition.    Patient is a 74-year-old female who presented to the emergency department for evaluation of acute on chronic low back pain radiating into right hip and leg.  On exam the patient's bilateral extremities are nonswollen, there is no calf size disparity calf swelling or tenderness with no clinical suspicion for DVT, easily palpable pulses are noted with no clinical suspicion for acute ischemia.  Patient has midline tender in the low back with some pain in the right hip when palpating over the inguinal crease, plan for low back CT scan along with right hip x-ray and pain management.    CT lumbar spine was unremarkable per radiologist right hip x-ray reassuring with no fracture.  I suspect acute on chronic back pain is primary cause patient has already established pain management provider and pain medication at home, will add muscle relaxer.  Strict ED return precautions were explained, patient verbalized understanding of and agreement this plan of care                     Differential diagnosis:    Differential diagnosis included but was not limited to acute on chronic back pain, discitis, arthritis, sacroiliitis, sciatica, lumbar stenosis, compression fracture      Additional sources:    - Discussed/ obtained information from independent historians: Daughter at bedside    - External (non-ED) record review: Primary care provider notes of this patient were independently reviewed by me.  This review reveals the patient does have a history of primary osteoarthritis of both knees and follows with orthopedic surgery.    - Chronic or social conditions impacting care: None    Orders placed during this visit:  Orders Placed This Encounter    Procedures    CT Lumbar Spine Without Contrast    XR Hip With or Without Pelvis 2 - 3 View Right    Urinalysis With Culture If Indicated - Urine, Clean Catch    Ambulatory Referral to Orthopedic Surgery         Additional orders considered but not ordered: None      ED Course:    Consultants: None    ED Course as of 12/10/24 1902   Tue Dec 10, 2024   1649 I have reviewed the mid-level provider(s) note and verbally discussed the case/plan of care.  I was available for consultation as needed at all times during the patient's visit in the Emergency Department.  I agree with the clinical impression, plan, and disposition unless otherwise stated in the MDM below.    ATTENDING ATTESTATION  HPI: 74-year-old female presents with acute on chronic right hip pain.  Has history of chronic pain.  Currently follows with pain management.  Currently takes high-dose narcotics. No reported new trauma to the hip or back.    MDM: ED workup reviewed.    I have reviewed the labs results.  UA negative for hematuria or infection.    Radiology reports reviewed.     CT Lumbar Spine Without Contrast    Result Date: 12/10/2024  Impression: 1. No acute findings. 2. Lumbar spondylosis. Authenticated and Electronically Signed by Fox Cooper MD on 12/10/2024 06:48:32 PM    XR Hip With or Without Pelvis 2 - 3 View Right    Result Date: 12/10/2024  No acute bony abnormality.      This report was signed and finalized on 12/10/2024 3:26 PM by Lakisha Ace MD.       [JS]      ED Course User Index  [JS] Antelmo Cifuentes DO              Shared Decision Making:  After my consideration of clinical presentation and any laboratory/radiology studies obtained, I discussed the findings with the patient/patient representative who is in agreement with the treatment plan and the final disposition.   Risks and benefits of discharge and/or observation/admission were discussed.       AS OF 19:02 EST VITALS:    BP - 170/99  HR - 69  TEMP - 98.1 °F (36.7 °C)  (Oral)  O2 SATS - 93%                  DIAGNOSIS  Final diagnoses:   Acute midline low back pain with right-sided sciatica         DISPOSITION  Discharge home      Please note that portions of this document were completed with voice recognition software.      Brian Red PA-C  12/10/24 1653

## 2024-12-11 NOTE — DISCHARGE INSTRUCTIONS
We recommend close follow-up with both orthopedic spine specialist Dr. Chaudhry for further evaluation as well as with your pain management provider for further evaluation.  Return to the ER immediately if develop any urinary retention, bowel or bladder incontinence, numbness or tingling in the groin area, fever, or for any other concern.